# Patient Record
Sex: FEMALE | Race: WHITE | NOT HISPANIC OR LATINO | Employment: FULL TIME | URBAN - METROPOLITAN AREA
[De-identification: names, ages, dates, MRNs, and addresses within clinical notes are randomized per-mention and may not be internally consistent; named-entity substitution may affect disease eponyms.]

---

## 2017-08-17 ENCOUNTER — LAB REQUISITION (OUTPATIENT)
Dept: LAB | Facility: HOSPITAL | Age: 27
End: 2017-08-17
Payer: COMMERCIAL

## 2017-08-17 ENCOUNTER — ALLSCRIPTS OFFICE VISIT (OUTPATIENT)
Dept: OTHER | Facility: OTHER | Age: 27
End: 2017-08-17

## 2017-08-17 DIAGNOSIS — Z01.419 ENCOUNTER FOR GYNECOLOGICAL EXAMINATION WITHOUT ABNORMAL FINDING: ICD-10-CM

## 2017-08-17 PROCEDURE — G0145 SCR C/V CYTO,THINLAYER,RESCR: HCPCS | Performed by: NURSE PRACTITIONER

## 2017-08-23 LAB
LAB AP GYN PRIMARY INTERPRETATION: NORMAL
LAB AP LMP: NORMAL
Lab: NORMAL

## 2017-08-29 ENCOUNTER — GENERIC CONVERSION - ENCOUNTER (OUTPATIENT)
Dept: OTHER | Facility: OTHER | Age: 27
End: 2017-08-29

## 2018-01-17 NOTE — PROGRESS NOTES
Assessment    1  Encounter for routine gynecological examination with Papanicolaou smear of cervix   (V72 31,V76 2) (Z01 419)   2  Contraception management (V25 9) (Z30 9)   3  ASCUS with positive high risk HPV cervical (795 01,795 05) (R87 610,R87 810)    Plan  Contraception management    · NuvaRing 0 12-0 015 MG/24HR Vaginal Ring; INSERT 1 RING VAGINALLY FOR 3  WEEKS THEN 1 WEEK OFF   Rx By: Elyssa Mcneil; Dispense: 84 Days ; #:3 Ring; Refill: 3; For: Contraception management; SHAHBAZ = N; Verified Transmission to [id number] Last Updated By: System, Resolver; 8/17/2017 12:07:29 PM  Unlinked    · Follow-up visit in 1 year Evaluation and Treatment  Follow-up  Status: Hold For -  Scheduling  Requested for: [id number]   Ordered; Ordered By: Elyssa Mcneil Performed:  Due: [id number]    Discussion/Summary  healthy adult female Currently, she eats an adequate diet and has an adequate exercise regimen  the risks and benefits of cervical cancer screening were discussed Pap test with reflex HPV testing was done today Breast cancer screening: self breast exam technique was taught, monthly self breast exam was advised and breast cancer screening is not indicated  Colorectal cancer screening: colorectal cancer screening is not indicated  Osteoporosis screening: bone mineral density testing is not indicated  Advice and education were given regarding nutrition, aerobic exercise, reproductive health and contraception  We discussed her abnormal pap smear history and the current guidelines  We again discussed ways that she can boost her immune system  She is aware that the pap smear results are pending  The patient has the current Goals: To prevent pregnancy  To have a normal pap smear  The patent has the current Barriers: None  Patient is able to Self-Care        Chief Complaint  Pt presents for a yearly exam      History of Present Illness  HPI: The pt  relates that she continues to use the NuvaRing and usually continuously x 3 months without problems  She did have an abnormal pap smear last year while in [address] (ASCUS with positive HPV) and did have a colposcopy  GYN HM, Adult Female [facility]: The patient is being seen for a health maintenance and gynecology evaluation  The last health maintenance visit was 3 5 year(s) ago  General Health: The patient's health since the last visit is described as good  Lifestyle:  She consumes a diverse and healthy diet  She exercises regularly  She does not use tobacco    Reproductive health:  she reports no menstrual problems  Menstrual history: The cycles have been regular  The duration of her recent periods has been regular  she uses contraception  For contraception, she uses the intravaginal ring  she is sexually active  Screening: cancer screening reviewed and current  Review of Systems    Constitutional: No fever, no chills, feels well, no tiredness, no recent weight gain or loss  Cardiovascular: no complaints of slow or fast heart rate, no chest pain, no palpitations, no leg claudication or lower extremity edema  Respiratory: no complaints of shortness of breath, no wheezing, no dyspnea on exertion, no orthopnea or PND  Breasts: no complaints of breast pain, breast lump or nipple discharge  Gastrointestinal: no complaints of abdominal pain, no constipation, no nausea or diarrhea, no vomiting, no bloody stools  Genitourinary: no complaints of dysuria, no incontinence, no pelvic pain, no dysmenorrhea, no vaginal discharge or abnormal vaginal bleeding and as noted in HPI  Integumentary: no complaints of skin rash or lesion, no itching or dry skin, no skin wounds  Neurological: no complaints of headache, no confusion, no numbness or tingling, no dizziness or fainting  Active Problems    1  Encounter for routine gynecological examination with Papanicolaou smear of cervix   (V72 31,V76 2) (Z01 419)   2  Irregular menstrual cycle (626 4) (N92 6)   3  Screening for STD (sexually transmitted disease) (V74 5) (Z11 3)    Past Medical History    · History of infectious mononucleosis (V12 09) (Z86 19)   · Denied: History of pregnancy    The active problems and past medical history were reviewed and updated today  Surgical History    · History of Appendectomy   · History of Biopsy Of Cervix   · History of Oral Surgery Tooth Extraction    The surgical history was reviewed and updated today  Family History  Father    · Family history of diabetes mellitus (V18 0) (Z83 3)   · Family history of sleep apnea (V19 8) (Z82 0)   · Family history of Hypertension (V17 49)   · Family history of Pure Hypercholesterolemia  Maternal Great Grandmother    · Family history of malignant neoplasm of breast (V16 3) (Z80 3)  Family History    · Family history of hypertension (V17 49) (Z82 49)   · Family history of Stroke Syndrome (V17 1)    The family history was reviewed and updated today  Social History    · Marital History - Single   · Never A Smoker   · Social alcohol use (Z78 9)   · Student  The social history was reviewed and is unchanged  Current Meds   1  Biotin CAPS; Therapy: (Recorded:17Aug2017) to Recorded   2  Ciprofloxacin HCl - 500 MG Oral Tablet; Therapy: 89PDW3669 to (Last Rx:24Jzm6348)  Requested for: 68IIZ5317 Ordered   3  Fexofenadine HCl - 180 MG Oral Tablet; Therapy: 09IOU2022 to (Last Rx:08Mar2011)  Requested for: 70QQS0448 Ordered   4  Fluticasone Propionate 50 MCG/ACT Nasal Suspension; Therapy: 48Ebm2293 to (Last Rx:12Xrs1588)  Requested for: 88Tmy1780 Ordered   5  NuvaRing 0 12-0 015 MG/24HR Vaginal Ring; USE 1 RING MONTHLY AS DIRECTED; Therapy: 84LQR1627 to (Rahul Paulino)  Requested for: 85Oss7728; Last   Rx:52Bqq0291 Ordered   6  PredniSONE 5 MG Oral Tablet; Therapy: 61ILU9908 to (Last Rx:81Hgg3836)  Requested for: 23OTI1978 Ordered   7  PROzac CAPS; Therapy: (Recorded:55Wsk3322) to Recorded   8  Vitamin C TABS;    Therapy: (Recorded:80Ooj2519) to Recorded    Allergies    1  Duricef CAPS   2  Pediazole SUSR    3  Seasonal    Vitals   Recorded: 29NZX1256 40:89UZ   Systolic 903   Diastolic 68   Height 5 ft 6 in   Weight 136 lb 2 oz   BMI Calculated 21 97   BSA Calculated 1 7   LMP 56Row7074     Physical Exam    Constitutional   General appearance: No acute distress, well appearing and well nourished  Neck   Neck: Normal, supple, trachea midline, no masses  Thyroid: Normal, no thyromegaly  Pulmonary   Respiratory effort: No increased work of breathing or signs of respiratory distress  Auscultation of lungs: Clear to auscultation  Cardiovascular   Auscultation of heart: Normal rate and rhythm, normal S1 and S2, no murmurs  Genitourinary   External genitalia: Normal and no lesions appreciated  Vagina: Normal, no lesions or dryness appreciated  Urethra: Normal     Urethral meatus: Normal     Bladder: Normal, soft, non-tender and no prolapse or masses appreciated  Cervix: Normal, no palpable masses  A Pap smear was performed  Uterus: Normal, non-tender, not enlarged, and no palpable masses  Adnexa/parametria: Normal, non-tender and no fullness or masses appreciated  Chest   Breasts: Normal and no dimpling or skin changes noted  Abdomen   Abdomen: Normal, non-tender, and no organomegaly noted  Liver and spleen: No hepatomegaly or splenomegaly  Examination for hernias: No hernias appreciated  Lymphatic   Palpation of lymph nodes in neck, axillae, groin and/or other locations: No lymphadenopathy or masses noted  Skin   Skin and subcutaneous tissue: Normal skin turgor and no rashes  Psychiatric   Orientation to person, place, and time: Normal     Mood and affect: Normal        Signatures   Electronically signed by : Asmita Raya;  Aug 17 2017 12:09PM EST                       (Author)    Electronically signed by : Ken Cisneros DO; Aug 18 2017  9:37AM EST

## 2018-01-18 NOTE — RESULT NOTES
Verified Results  (1) THIN PREP PAP WITH IMAGING 04GDG6635 20:15NW Malathi Harrison Order Number: [id number]     Test Name Result Flag Reference   LAB AP CASE REPORT (Report)     Gynecologic Cytology Report            Case: [id number]                  Authorizing Provider: HILDA Camarillo    Collected:      08/17/2017 1359        First Screen:     JERICA Nunes Received:      08/21/2017 1032        Specimen:  LIQUID-BASED PAP, SCREENING, Cervix, Endocervical   LAB AP GYN PRIMARY INTERPRETATION      Negative for intraepithelial lesion or malignancy  Electronically signed by JERICA Nunes on 8/23/2017 at 10:45 AM   LAB AP GYN SPECIMEN ADEQUACY      Satisfactory for evaluation  Endocervical/transformation zone component present  LAB AP GYN ADDITIONAL INFORMATION (Report)     [employer]'s FDA approved ,  and ThinPrep Imaging System are   utilized with strict adherence to the 's instruction manual to   prepare gynecologic and non-gynecologic cytology specimens for the   production of ThinPrep slides as well as for gynecologic ThinPrep imaging  These processes have been validated by our laboratory and/or by the     The Pap test is not a diagnostic procedure and should not be used as the   sole means to detect cervical cancer  It is only a screening procedure to   aid in the detection of cervical cancer and its precursors  Both   false-negative and false-positive results have been experienced  Your   patient's test result should be interpreted in this context together with   the history and clinical findings     LAB AP LMP 7/24/2017

## 2018-01-22 VITALS
SYSTOLIC BLOOD PRESSURE: 124 MMHG | BODY MASS INDEX: 21.88 KG/M2 | HEIGHT: 66 IN | DIASTOLIC BLOOD PRESSURE: 68 MMHG | WEIGHT: 136.13 LBS

## 2019-07-30 NOTE — PROGRESS NOTES
Assessment/Plan:    Will check B/L diagnostic breast ultrasound  The patient likes current contraceptive method and desires to continue [employer]  Recommended monthly SBE and annual CBE  ASCCP guidelines reviewed and pap collected today  STI testing completed today at patient request   She is aware that condoms are recommended with all sexual contact for STI prevention  Reviewed diet/activity recommendations  Return to the office in one year for routine annual gyn exam or sooner PRN  Diagnoses and all orders for this visit:    Encounter for gynecological examination (general) (routine) with abnormal findings    Breast mass  -     US BREAST BILATERAL LIMITED (DIAGNOSTIC); Future    Encounter for surveillance of vaginal ring hormonal contraceptive device  -     etonogestrel-ethinyl estradiol (NUVARING) 0 12-0 015 MG/24HR vaginal ring; Insert vaginally and leave in place for 3 consecutive weeks, then remove for 1 week  Subjective:      Patient ID: Tyrese Haas is a 33 y o  female  This patient presents for routine annual gyn exam    She denies acute gyn complaints  Menses are light and regular  Cystic densities noted on exam and patient stated she has been evaluated for cystic breasts about 4-5 years ago  She denies abn discharge, pelvic pain, bowel/bladder dysfunction  She's had a new sexual partner in the last 6 months and is requesting STI testing  She is aware of financial responsibility  Has had recent screening with blood work secondary to recent work exposure  Patient recently finished dental school, 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  Likes current contraception and desires to continue with Nuva Ring  Last pap 8/27/18  That was the first normal after previous abnormal pap  Will repeat today          The following portions of the patient's history were reviewed and updated as appropriate: allergies, current medications, past family history, past medical history, past social history, past surgical history and problem list     Review of Systems   Constitutional: Negative  HENT: Negative  Respiratory: Negative  Cardiovascular: Negative  Gastrointestinal: Negative  Endocrine: Negative  Genitourinary: Negative for difficulty urinating, dyspareunia, dysuria, frequency, menstrual problem, pelvic pain, urgency, vaginal bleeding, vaginal discharge and vaginal pain  Musculoskeletal: Negative  Skin: Negative  Neurological: Negative  Psychiatric/Behavioral: Negative  Objective:      /74 (BP Location: Right arm)   Pulse 81   Ht 5' 6" (1 676 m)   Wt 62 5 kg (137 lb 12 8 oz)   LMP 06/10/2019   BMI 22 24 kg/m²          Physical Exam   Constitutional: She is oriented to person, place, and time  She appears well-developed and well-nourished  She is cooperative  HENT:   Head: Normocephalic and atraumatic  Neck: Normal range of motion  Neck supple  No thyroid mass and no thyromegaly present  Cardiovascular: Normal rate, regular rhythm and normal heart sounds  Pulmonary/Chest: Effort normal and breath sounds normal  Right breast exhibits mass (cystic densities noted B/L throughout with concentration noted between 12 and 1 oclocl on RIGHT at areola)  Right breast exhibits no inverted nipple, no nipple discharge, no skin change and no tenderness  Left breast exhibits mass (cystic densities noted B/L throughout with concentration noted between 12 and 1 oclocl on RIGHT at areola)  Left breast exhibits no inverted nipple, no nipple discharge, no skin change and no tenderness  No breast tenderness or discharge  Breasts are symmetrical    Abdominal: Soft  Normal appearance and bowel sounds are normal  There is no hepatosplenomegaly  There is no tenderness  Hernia confirmed negative in the right inguinal area and confirmed negative in the left inguinal area  Genitourinary: Vagina normal and uterus normal  Rectal exam shows no external hemorrhoid   No breast tenderness or discharge  Pelvic exam was performed with patient supine  No labial fusion  There is no rash, tenderness, lesion or injury on the right labia  There is no rash, tenderness, lesion or injury on the left labia  Uterus is not deviated, not enlarged, not fixed and not tender  Cervix exhibits no motion tenderness, no discharge and no friability  Right adnexum displays no mass, no tenderness and no fullness  Left adnexum displays no mass, no tenderness and no fullness  No erythema, tenderness or bleeding in the vagina  No signs of injury around the vagina  No vaginal discharge found  Genitourinary Comments: Urethra normal without lesions  No bladder tenderness   Musculoskeletal: Normal range of motion  Lymphadenopathy:     She has no axillary adenopathy  No inguinal adenopathy noted on the right or left side  Right: No inguinal adenopathy present  Left: No inguinal adenopathy present  Neurological: She is alert and oriented to person, place, and time  Skin: Skin is warm, dry and intact  Psychiatric: She has a normal mood and affect  Her speech is normal and behavior is normal  Cognition and memory are normal    Nursing note and vitals reviewed

## 2019-08-01 ENCOUNTER — ANNUAL EXAM (OUTPATIENT)
Dept: GYNECOLOGY | Facility: CLINIC | Age: 29
End: 2019-08-01
Payer: COMMERCIAL

## 2019-08-01 VITALS
DIASTOLIC BLOOD PRESSURE: 74 MMHG | BODY MASS INDEX: 22.14 KG/M2 | HEIGHT: 66 IN | WEIGHT: 137.8 LBS | HEART RATE: 81 BPM | SYSTOLIC BLOOD PRESSURE: 120 MMHG

## 2019-08-01 DIAGNOSIS — Z01.411 ENCOUNTER FOR GYNECOLOGICAL EXAMINATION (GENERAL) (ROUTINE) WITH ABNORMAL FINDINGS: Primary | ICD-10-CM

## 2019-08-01 DIAGNOSIS — Z30.44 ENCOUNTER FOR SURVEILLANCE OF VAGINAL RING HORMONAL CONTRACEPTIVE DEVICE: ICD-10-CM

## 2019-08-01 DIAGNOSIS — Z12.4 ENCOUNTER FOR PAPANICOLAOU SMEAR FOR CERVICAL CANCER SCREENING: ICD-10-CM

## 2019-08-01 DIAGNOSIS — N63.0 BREAST MASS: ICD-10-CM

## 2019-08-01 DIAGNOSIS — Z11.3 SCREENING EXAMINATION FOR STD (SEXUALLY TRANSMITTED DISEASE): ICD-10-CM

## 2019-08-01 PROCEDURE — S0612 ANNUAL GYNECOLOGICAL EXAMINA: HCPCS | Performed by: OBSTETRICS & GYNECOLOGY

## 2019-08-01 PROCEDURE — 87491 CHLMYD TRACH DNA AMP PROBE: CPT | Performed by: OBSTETRICS & GYNECOLOGY

## 2019-08-01 PROCEDURE — 87591 N.GONORRHOEAE DNA AMP PROB: CPT | Performed by: OBSTETRICS & GYNECOLOGY

## 2019-08-01 PROCEDURE — G0145 SCR C/V CYTO,THINLAYER,RESCR: HCPCS | Performed by: OBSTETRICS & GYNECOLOGY

## 2019-08-01 RX ORDER — ETONOGESTREL AND ETHINYL ESTRADIOL 11.7; 2.7 MG/1; MG/1
INSERT, EXTENDED RELEASE VAGINAL
Qty: 1 EACH | Refills: 11 | Status: SHIPPED | OUTPATIENT
Start: 2019-08-01 | End: 2020-02-07 | Stop reason: SDUPTHER

## 2019-08-01 NOTE — PATIENT INSTRUCTIONS
Will check B/L diagnostic breast ultrasound  The patient likes current contraceptive method and desires to continue [employer]  Recommended monthly SBE and annual CBE  ASCCP guidelines reviewed and pap collected today  STI testing completed today at patient request   She is aware that condoms are recommended with all sexual contact for STI prevention  Reviewed diet/activity recommendations   Return to the office in one year for routine annual gyn exam or sooner PRN

## 2019-08-05 LAB
C TRACH DNA SPEC QL NAA+PROBE: NEGATIVE
N GONORRHOEA DNA SPEC QL NAA+PROBE: NEGATIVE

## 2019-08-06 LAB
LAB AP GYN PRIMARY INTERPRETATION: NORMAL
Lab: NORMAL

## 2019-08-15 ENCOUNTER — HOSPITAL ENCOUNTER (OUTPATIENT)
Dept: ULTRASOUND IMAGING | Facility: CLINIC | Age: 29
Discharge: HOME | End: 2019-08-15
Payer: COMMERCIAL

## 2019-08-15 VITALS — HEIGHT: 66 IN | WEIGHT: 138 LBS | BODY MASS INDEX: 22.18 KG/M2

## 2019-08-15 DIAGNOSIS — N63.0 BREAST MASS: ICD-10-CM

## 2019-08-15 PROCEDURE — 76642 ULTRASOUND BREAST LIMITED: CPT

## 2019-09-26 ENCOUNTER — TELEPHONE (OUTPATIENT)
Dept: GYNECOLOGY | Facility: CLINIC | Age: 29
End: 2019-09-26

## 2019-09-26 NOTE — TELEPHONE ENCOUNTER
Patient called and is having questions about a bill  She did call [facility] but did not get any satisfaction  Can you please call her?

## 2019-09-26 NOTE — TELEPHONE ENCOUNTER
Called the patient and informed her that I would talk to our billing department to potentially give her a one time courtesy write off  Informed her once I got the information that I would call her back sometime next week

## 2020-02-07 DIAGNOSIS — Z30.44 ENCOUNTER FOR SURVEILLANCE OF VAGINAL RING HORMONAL CONTRACEPTIVE DEVICE: ICD-10-CM

## 2020-02-07 RX ORDER — ETONOGESTREL AND ETHINYL ESTRADIOL 11.7; 2.7 MG/1; MG/1
INSERT, EXTENDED RELEASE VAGINAL
Qty: 1 EACH | Refills: 6 | Status: SHIPPED | OUTPATIENT
Start: 2020-02-07 | End: 2020-08-03 | Stop reason: SDUPTHER

## 2020-08-03 DIAGNOSIS — Z30.44 ENCOUNTER FOR SURVEILLANCE OF VAGINAL RING HORMONAL CONTRACEPTIVE DEVICE: ICD-10-CM

## 2020-08-03 RX ORDER — ETONOGESTREL AND ETHINYL ESTRADIOL 11.7; 2.7 MG/1; MG/1
INSERT, EXTENDED RELEASE VAGINAL
Qty: 1 EACH | Refills: 0 | Status: SHIPPED | OUTPATIENT
Start: 2020-08-03 | End: 2020-08-04

## 2020-08-04 RX ORDER — ETONOGESTREL AND ETHINYL ESTRADIOL 11.7; 2.7 MG/1; MG/1
INSERT, EXTENDED RELEASE VAGINAL
Qty: 3 EACH | Refills: 3 | Status: SHIPPED | OUTPATIENT
Start: 2020-08-04 | End: 2020-08-06 | Stop reason: SDUPTHER

## 2020-08-05 NOTE — PROGRESS NOTES
Assessment/Plan:    Normal findings on routine gyn exam  The patient likes current contraceptive method and desires to continue Nuva Ring continuously 3 months at a time  Recommended monthly SBE and annual CBE  ASCCP guidelines reviewed and pap collected today at patient's request  The patient declines STI testing at this time  She is aware that condoms are recommended with all sexual contact for STI prevention  Reviewed diet/activity recommendations  Return to the office in one year for routine annual gyn exam or sooner PRN  Diagnoses and all orders for this visit:    Encounter for gynecological examination (general) (routine) without abnormal findings    Encounter for surveillance of vaginal ring hormonal contraceptive device  -     etonogestrel-ethinyl estradiol (NUVARING) 0 12-0 015 MG/24HR vaginal ring; INSERT 1 RING VAGINALLY AS DIRECTED  REMOVE AFTER 3 WEEKS & WAIT 7 DAYS BEFORE INSERTING A NEW RING        Subjective:      Patient ID: Cely Haas is a 30 y o  female  This patient presents for routine annual gyn exam    Patient has known hx of B/L breast cysts, not bothersome  Menses are light on Nuva Ring  She uses it continuously for 3 months at a time  She denies breast concerns, abn discharge, pelvic pain, bowel/bladder dysfunction, depression/anxiety  Patient has not been sexually active since last visit  Denies STI concerns  Likes current contraception and desires to continue  Negative pap, GC/CT 8/1/19  She is requesting repeat pap  The following portions of the patient's history were reviewed and updated as appropriate: allergies, current medications, past family history, past medical history, past social history, past surgical history and problem list     Review of Systems   Constitutional: Negative  HENT: Negative  Respiratory: Negative  Cardiovascular: Negative  Gastrointestinal: Negative  Endocrine: Negative      Genitourinary: Negative for difficulty urinating, dyspareunia, dysuria, frequency, menstrual problem, pelvic pain, urgency, vaginal bleeding, vaginal discharge and vaginal pain  Musculoskeletal: Negative  Skin: Negative  Neurological: Negative  Psychiatric/Behavioral: Negative  Objective:      /62 (BP Location: Right arm, Patient Position: Sitting, Cuff Size: Standard)   Pulse 66   Ht 5' 6 5" (1 689 m)   Wt 59 9 kg (132 lb)   BMI 20 99 kg/m²          Physical Exam   Constitutional: She is oriented to person, place, and time  She appears well-developed  She is cooperative  HENT:   Head: Normocephalic and atraumatic  Neck: Normal range of motion  Neck supple  No thyroid mass and no thyromegaly present  Cardiovascular: Normal rate, regular rhythm and normal heart sounds  Pulmonary/Chest: Effort normal and breath sounds normal  Right breast exhibits no inverted nipple, no mass, no nipple discharge, no skin change and no tenderness  Left breast exhibits no inverted nipple, no mass, no nipple discharge, no skin change and no tenderness  Breasts are symmetrical    Abdominal: Soft  Normal appearance and bowel sounds are normal  There is no abdominal tenderness  Hernia confirmed negative in the left inguinal area and confirmed negative in the right inguinal area  Genitourinary:    Vulva and vagina normal    Rectum:      No external hemorrhoid  Pelvic exam was performed with patient supine  There is no rash, tenderness, lesion or injury on the right labia  There is no rash, tenderness, lesion or injury on the left labia  Uterus is not deviated, not enlarged, not fixed and not tender  Cervix exhibits no motion tenderness, no lesion, no discharge and no friability  Right adnexum displays no mass, no tenderness and no fullness  Left adnexum displays no mass, no tenderness and no fullness  No vaginal discharge, erythema, tenderness or bleeding  No erythema, tenderness or bleeding in the vagina      No foreign body in the vagina  No signs of injury or lesions in the vagina  Genitourinary Comments: Urethra normal without lesions  No bladder tenderness     Musculoskeletal: Normal range of motion  Lymphadenopathy: No inguinal adenopathy noted on the right or left side  Neurological: She is alert and oriented to person, place, and time  Skin: Skin is warm and dry  Psychiatric: Her speech is normal and behavior is normal    Nursing note and vitals reviewed

## 2020-08-06 ENCOUNTER — ANNUAL EXAM (OUTPATIENT)
Dept: GYNECOLOGY | Facility: CLINIC | Age: 30
End: 2020-08-06
Payer: COMMERCIAL

## 2020-08-06 VITALS
HEIGHT: 67 IN | WEIGHT: 132 LBS | BODY MASS INDEX: 20.72 KG/M2 | HEART RATE: 66 BPM | DIASTOLIC BLOOD PRESSURE: 62 MMHG | SYSTOLIC BLOOD PRESSURE: 102 MMHG

## 2020-08-06 DIAGNOSIS — Z01.419 ENCOUNTER FOR GYNECOLOGICAL EXAMINATION WITH PAPANICOLAOU SMEAR OF CERVIX: ICD-10-CM

## 2020-08-06 DIAGNOSIS — Z01.419 ENCOUNTER FOR GYNECOLOGICAL EXAMINATION (GENERAL) (ROUTINE) WITHOUT ABNORMAL FINDINGS: Primary | ICD-10-CM

## 2020-08-06 DIAGNOSIS — Z30.44 ENCOUNTER FOR SURVEILLANCE OF VAGINAL RING HORMONAL CONTRACEPTIVE DEVICE: ICD-10-CM

## 2020-08-06 PROCEDURE — G0145 SCR C/V CYTO,THINLAYER,RESCR: HCPCS | Performed by: OBSTETRICS & GYNECOLOGY

## 2020-08-06 PROCEDURE — S0612 ANNUAL GYNECOLOGICAL EXAMINA: HCPCS | Performed by: OBSTETRICS & GYNECOLOGY

## 2020-08-06 RX ORDER — LORATADINE 10 MG/1
10 TABLET ORAL
COMMUNITY

## 2020-08-06 RX ORDER — ETONOGESTREL AND ETHINYL ESTRADIOL 11.7; 2.7 MG/1; MG/1
INSERT, EXTENDED RELEASE VAGINAL
Qty: 3 EACH | Refills: 3 | Status: SHIPPED | OUTPATIENT
Start: 2020-08-06 | End: 2021-08-12 | Stop reason: SDUPTHER

## 2020-08-12 LAB
LAB AP GYN PRIMARY INTERPRETATION: NORMAL
Lab: NORMAL

## 2021-08-10 NOTE — PROGRESS NOTES
Assessment/Plan:    The patient likes current contraceptive method and desires to continue the use of continuous Nuva Ring  Recommended monthly SBE and annual CBE  ASCCP guidelines reviewed and pap collected today given new partner  GC/CT sent  100% condom use  recommended with all sexual contact for STI prevention  Reviewed diet/activity recommendations  Return to the office in one year for routine annual gyn exam or sooner PRN  Diagnoses and all orders for this visit:    Encounter for gynecological examination (general) (routine) without abnormal findings    Encounter for surveillance of vaginal ring hormonal contraceptive device  -     etonogestrel-ethinyl estradiol (NUVARING) 0 12-0 015 MG/24HR vaginal ring; USES CONTINUOUSLY    Encounter for Papanicolaou smear for cervical cancer screening  -     Liquid-based pap, screening    High risk sexual behavior, unspecified type  -     Chlamydia/GC amplified DNA by PCR    Screening examination for STD (sexually transmitted disease)  -     Chlamydia/GC amplified DNA by PCR        Subjective:      Patient ID: Elmo Gibbons is a 35 y o  female  This patient presents for routine annual gyn exam    She denies acute gyn complaints  Menses are absent on continuous Nuva Ring  Has a hx of breast cysts, no bothersome  She does do SBE  She denies abn discharge, pelvic pain, bowel/bladder dysfunction, depression/anxiety  New sexual partner for 4 weeks without condom use  She did have negative HIV, Hep B testing prior to possibly starting Humira  She is requesting STI screening and will add GC/CT to pap  Likes current contraception and desires to continue  Pap 8/6/20  Patient states she has had the Gardasil series          The following portions of the patient's history were reviewed and updated as appropriate: allergies, current medications, past family history, past medical history, past social history, past surgical history and problem list     Review of Systems Constitutional: Negative  HENT: Negative  Respiratory: Negative  Cardiovascular: Negative  Gastrointestinal: Negative  Endocrine: Negative  Genitourinary: Negative for difficulty urinating, dyspareunia, dysuria, frequency, menstrual problem, pelvic pain, urgency, vaginal bleeding, vaginal discharge and vaginal pain  Musculoskeletal: Negative  Skin: Negative  Neurological: Negative  Psychiatric/Behavioral: Negative  Objective:      /68   Pulse 97   Ht 5' 6" (1 676 m)   Wt 63 1 kg (139 lb 3 2 oz)   BMI 22 47 kg/m²          Physical Exam  Vitals and nursing note reviewed  Exam conducted with a chaperone present  Constitutional:       Appearance: Normal appearance  She is well-developed  HENT:      Head: Normocephalic and atraumatic  Neck:      Thyroid: No thyroid mass or thyromegaly  Cardiovascular:      Rate and Rhythm: Normal rate and regular rhythm  Heart sounds: Normal heart sounds  Pulmonary:      Effort: Pulmonary effort is normal       Breath sounds: Normal breath sounds  Chest:      Breasts: Breasts are symmetrical          Right: No inverted nipple, mass, nipple discharge, skin change or tenderness  Left: No inverted nipple, mass, nipple discharge, skin change or tenderness  Abdominal:      General: Bowel sounds are normal       Palpations: Abdomen is soft  Tenderness: There is no abdominal tenderness  Hernia: There is no hernia in the left inguinal area or right inguinal area  Genitourinary:     General: Normal vulva  Exam position: Supine  Pubic Area: No rash  Labia:         Right: No rash, tenderness, lesion or injury  Left: No rash, tenderness, lesion or injury  Urethra: No prolapse, urethral pain, urethral swelling or urethral lesion  Vagina: Normal  No signs of injury and foreign body  No vaginal discharge, erythema, tenderness, bleeding, lesions or prolapsed vaginal walls  Cervix: Friability, erythema and cervical bleeding (with pap) present  No cervical motion tenderness, discharge, lesion or eversion  Uterus: Not deviated, not enlarged, not fixed, not tender and no uterine prolapse  Adnexa:         Right: No mass, tenderness or fullness  Left: No mass, tenderness or fullness  Rectum: No external hemorrhoid  Comments: Urethra normal without lesions  No bladder tenderness  Musculoskeletal:         General: Normal range of motion  Cervical back: Normal range of motion and neck supple  Lymphadenopathy:      Lower Body: No right inguinal adenopathy  No left inguinal adenopathy  Skin:     General: Skin is warm and dry  Neurological:      Mental Status: She is alert and oriented to person, place, and time  Psychiatric:         Speech: Speech normal          Behavior: Behavior normal  Behavior is cooperative

## 2021-08-12 ENCOUNTER — ANNUAL EXAM (OUTPATIENT)
Dept: GYNECOLOGY | Facility: CLINIC | Age: 31
End: 2021-08-12
Payer: COMMERCIAL

## 2021-08-12 VITALS
SYSTOLIC BLOOD PRESSURE: 118 MMHG | HEART RATE: 97 BPM | WEIGHT: 139.2 LBS | BODY MASS INDEX: 22.37 KG/M2 | HEIGHT: 66 IN | DIASTOLIC BLOOD PRESSURE: 68 MMHG

## 2021-08-12 DIAGNOSIS — Z01.419 ENCOUNTER FOR GYNECOLOGICAL EXAMINATION (GENERAL) (ROUTINE) WITHOUT ABNORMAL FINDINGS: Primary | ICD-10-CM

## 2021-08-12 DIAGNOSIS — Z30.44 ENCOUNTER FOR SURVEILLANCE OF VAGINAL RING HORMONAL CONTRACEPTIVE DEVICE: ICD-10-CM

## 2021-08-12 DIAGNOSIS — Z12.4 ENCOUNTER FOR PAPANICOLAOU SMEAR FOR CERVICAL CANCER SCREENING: ICD-10-CM

## 2021-08-12 DIAGNOSIS — Z11.3 SCREENING EXAMINATION FOR STD (SEXUALLY TRANSMITTED DISEASE): ICD-10-CM

## 2021-08-12 DIAGNOSIS — Z72.51 HIGH RISK SEXUAL BEHAVIOR, UNSPECIFIED TYPE: ICD-10-CM

## 2021-08-12 PROCEDURE — G0145 SCR C/V CYTO,THINLAYER,RESCR: HCPCS | Performed by: OBSTETRICS & GYNECOLOGY

## 2021-08-12 PROCEDURE — 87491 CHLMYD TRACH DNA AMP PROBE: CPT | Performed by: OBSTETRICS & GYNECOLOGY

## 2021-08-12 PROCEDURE — S0612 ANNUAL GYNECOLOGICAL EXAMINA: HCPCS | Performed by: OBSTETRICS & GYNECOLOGY

## 2021-08-12 PROCEDURE — 87591 N.GONORRHOEAE DNA AMP PROB: CPT | Performed by: OBSTETRICS & GYNECOLOGY

## 2021-08-12 PROCEDURE — G0476 HPV COMBO ASSAY CA SCREEN: HCPCS | Performed by: OBSTETRICS & GYNECOLOGY

## 2021-08-12 RX ORDER — LOTEPREDNOL ETABONATE 10 MG/ML
SUSPENSION TOPICAL
COMMUNITY
Start: 2021-08-04

## 2021-08-12 RX ORDER — ETONOGESTREL AND ETHINYL ESTRADIOL 11.7; 2.7 MG/1; MG/1
INSERT, EXTENDED RELEASE VAGINAL
Qty: 5 EACH | Refills: 3 | Status: SHIPPED | OUTPATIENT
Start: 2021-08-12

## 2021-08-16 LAB
HPV HR 12 DNA CVX QL NAA+PROBE: NEGATIVE
HPV16 DNA CVX QL NAA+PROBE: NEGATIVE
HPV18 DNA CVX QL NAA+PROBE: NEGATIVE

## 2021-08-17 LAB
C TRACH DNA SPEC QL NAA+PROBE: NEGATIVE
N GONORRHOEA DNA SPEC QL NAA+PROBE: NEGATIVE

## 2021-08-18 ENCOUNTER — TELEPHONE (OUTPATIENT)
Dept: GYNECOLOGY | Facility: CLINIC | Age: 31
End: 2021-08-18

## 2021-08-18 NOTE — TELEPHONE ENCOUNTER
Pt called complaining of some bleeding from her nuvaring  She stated was made aware of the breakthrough bleeding  She would like to know if she should keep it in until she is due to change it which would be Saturday or swap it out earlier  Pt made aware you are gone for the day and we will have an answer by tomorrow 8/19

## 2021-08-19 ENCOUNTER — DOCUMENTATION (OUTPATIENT)
Dept: GYNECOLOGY | Facility: CLINIC | Age: 31
End: 2021-08-19

## 2021-08-19 NOTE — TELEPHONE ENCOUNTER
Please let pt know she should check to make sure the ring is there and continue with the ring as normal

## 2021-08-23 LAB
LAB AP GYN PRIMARY INTERPRETATION: NORMAL
Lab: NORMAL

## 2021-08-30 ENCOUNTER — DOCUMENTATION (OUTPATIENT)
Dept: GYNECOLOGY | Facility: CLINIC | Age: 31
End: 2021-08-30

## 2021-09-07 ENCOUNTER — DOCUMENTATION (OUTPATIENT)
Dept: GYNECOLOGY | Facility: CLINIC | Age: 31
End: 2021-09-07

## 2021-09-21 ENCOUNTER — DOCUMENTATION (OUTPATIENT)
Dept: GYNECOLOGY | Facility: CLINIC | Age: 31
End: 2021-09-21

## 2024-06-02 ENCOUNTER — OFFICE VISIT (OUTPATIENT)
Age: 34
End: 2024-06-02
Payer: COMMERCIAL

## 2024-06-02 VITALS
RESPIRATION RATE: 18 BRPM | SYSTOLIC BLOOD PRESSURE: 124 MMHG | TEMPERATURE: 96.6 F | HEART RATE: 66 BPM | OXYGEN SATURATION: 100 % | DIASTOLIC BLOOD PRESSURE: 70 MMHG

## 2024-06-02 DIAGNOSIS — L73.9 FOLLICULITIS: ICD-10-CM

## 2024-06-02 DIAGNOSIS — B37.2 SKIN YEAST INFECTION: Primary | ICD-10-CM

## 2024-06-02 PROCEDURE — S9083 URGENT CARE CENTER GLOBAL: HCPCS | Performed by: EMERGENCY MEDICINE

## 2024-06-02 PROCEDURE — G0382 LEV 3 HOSP TYPE B ED VISIT: HCPCS | Performed by: EMERGENCY MEDICINE

## 2024-06-02 RX ORDER — FLUCONAZOLE 150 MG/1
150 TABLET ORAL ONCE
Qty: 1 TABLET | Refills: 0 | Status: SHIPPED | OUTPATIENT
Start: 2024-06-02 | End: 2024-06-02

## 2024-06-02 RX ORDER — DOXYCYCLINE 100 MG/1
100 TABLET ORAL 2 TIMES DAILY
Qty: 20 TABLET | Refills: 0 | Status: SHIPPED | OUTPATIENT
Start: 2024-06-02 | End: 2024-06-12

## 2024-06-02 RX ORDER — CLOTRIMAZOLE 1 %
CREAM (GRAM) TOPICAL 2 TIMES DAILY
Qty: 28 G | Refills: 0 | Status: SHIPPED | OUTPATIENT
Start: 2024-06-02

## 2024-06-02 NOTE — PATIENT INSTRUCTIONS
Folliculitis   WHAT YOU NEED TO KNOW:   Folliculitis is inflammation of your hair follicles. A hair follicle is a sac under your skin. Your hair grows out of the follicle. Folliculitis is caused by bacteria or funguses, most commonly a germ called Staph. Folliculitis can occur anywhere you have hair.  DISCHARGE INSTRUCTIONS:   Return to the emergency department if:   You develop large areas of red, warm, tender skin around the folliculitis.    You develop boils (red, painful bumps that develop under your skin).    Call your doctor or dermatologist if:   You have a fever.    You have foul-smelling pus coming from the bumps on your skin.    Your rash is spreading.    You have questions or concerns about your condition or care.    Medicines:  You may need any of the following:  Antibiotics  help fight or prevent a bacterial infection. It may be given as an ointment that you apply to your skin or as a pill. Always take your antibiotics exactly as ordered by your healthcare provider. Never save antibiotics or take leftover antibiotics that were given to you for another illness.    Antifungal medicine  may be given as an cream that you apply to your skin or take as a pill.    NSAIDs , such as ibuprofen, help decrease swelling, pain, and fever. This medicine is available with or without a doctor's order. NSAIDs can cause stomach bleeding or kidney problems in certain people. If you take blood thinner medicine, always ask if NSAIDs are safe for you. Always read the medicine label and follow directions. Do not give these medicines to children younger than 6 months without direction from a healthcare provider.     Antihistamines  help decrease itching.    Take your medicine as directed.  Contact your healthcare provider if you think your medicine is not helping or if you have side effects. Tell your provider if you are allergic to any medicine. Keep a list of the medicines, vitamins, and herbs you take. Include the amounts,  and when and why you take them. Bring the list or the pill bottles to follow-up visits. Carry your medicine list with you in case of an emergency.    Manage folliculitis:   Clean the area.  Use antibacterial soap to wash the affected area. Change your washcloths and towels every day.    Apply a warm compress.  Wet a clean washcloth with warm water and apply it to the infected skin area to help decrease pain and swelling. Warm compresses may also help drain pus and improve healing.    Do not shave the area.  If possible, do not shave areas that have folliculitis. If you must shave, use an electric razor or new blade every time you shave.    Prevent folliculitis:   Do not share personal items.  Personal items include towels, soap, nail cutters, dishes, and silverware.    Do not wear tight clothing.  Tight-fitting clothes may rub against and irritate your skin.    Treat skin injuries right away.  Wash an injury such as a cut or scrape right away. Use warm, soapy water. Cover the area with a bandage to prevent infection.    Follow up with your doctor or dermatologist as directed:  Write down your questions so you remember to ask them during your visits.  © Copyright [employer] 2023 Information is for End User's use only and may not be sold, redistributed or otherwise used for commercial purposes.  The above information is an  only. It is not intended as medical advice for individual conditions or treatments. Talk to your doctor, nurse or pharmacist before following any medical regimen to see if it is safe and effective for you.  Skin Yeast Infection   WHAT YOU NEED TO KNOW:   Yeast is normally present on the skin. Infection happens when you have too much yeast, or when it gets into a cut on your skin. Certain types of mold and fungus can cause a yeast infection. A skin yeast infection can appear anywhere on your skin or nail beds. Skin yeast infections are usually found on warm, moist parts of the body.  Examples include between skin folds or under the breasts.  DISCHARGE INSTRUCTIONS:   Return to the emergency department if:   You have signs of infection, such as pus, warmth or red streaks coming from the wound, or a fever.      Call your doctor if:   Your symptoms worsen or do not get better within 7 to 10 days.    You have new or returning signs of a skin yeast infection after treatment.    You have questions or concerns about your condition or care.    Medicines:   Antifungal medicine  may be given as a cream, ointment, or pill.    Take your medicine as directed.  Contact your healthcare provider if you think your medicine is not helping or if you have side effects. Tell your provider if you are allergic to any medicine. Keep a list of the medicines, vitamins, and herbs you take. Include the amounts, and when and why you take them. Bring the list or the pill bottles to follow-up visits. Carry your medicine list with you in case of an emergency.    Care for the skin near the infection:  You may only have discolored patches of skin, or areas that are dry and flaking. Care for these skin problems as directed by your healthcare provider. If you have painful skin or an open sore, you will need to protect the skin and prevent damage. You will also need to keep the skin dry as much as possible. Ask your healthcare provider how to care for your skin while the infection clears. The following are general guidelines for caring for painful or open skin:  Keep the skin clean.  Ask your healthcare provider if you should wash with mild soap and water. Do not use soap that contains alcohol. Alcohol can dry and irritate the skin and make symptoms worse. Your baby's healthcare provider may tell you to use diaper cream or ointment when you change his or her diaper. This will protect the skin and prevent moisture from collecting.    Keep the skin dry.  Pat the area dry with a towel. Do not rub, because this may irritate the skin.  If you have a skin yeast infection between skin folds, lift the top part gently and hold it while you dry between your skin folds. Always dry your feet completely after you swim or bathe, including between your toes. Dry your skin if you are sweating from exercise or exposure to heat. Use a clean towel each time to prevent spreading or continuing the infection.    Keep the skin protected.  Ask your healthcare provider if you should cover the area with a bandage or leave it open. Check your skin each day to make sure you do not have new or worsening problems. You may need to have someone check the skin if you cannot see the area easily.    Prevent another skin yeast infection:   Do not share clothing or towels    Wear shower shoes if you need to use a public shower    Dry your feet completely after you bathe, and apply antifungal powder or cream as directed    Put on socks before you get dressed so you do not spread fungus from your feet    Wear light clothing that allows air to get to your skin    Manage your weight to prevent skin folds where yeast can collect    Manage diabetes    Use antibiotics correctly to prevent antibiotic resistance    Change your baby's diaper often, and keep the area clean and dry as much as possible    Use a diaper cream or ointment that contains zinc oxide or dimethicone on your baby's diaper area as directed    Follow up with your doctor as directed:  Write down your questions so you remember to ask them during your visits.  © Copyright [employer] 2023 Information is for End User's use only and may not be sold, redistributed or otherwise used for commercial purposes.  The above information is an  only. It is not intended as medical advice for individual conditions or treatments. Talk to your doctor, nurse or pharmacist before following any medical regimen to see if it is safe and effective for you.  Doxycycline (By mouth)   Doxycycline ([id number])  Treats and prevents  infections. Also used to prevent malaria and treat rosacea or severe acne. This medicine is a tetracycline antibiotic.   Brand Name(s): Acticlate, Adoxa, Adoxa Daniel 1/150, Avidoxy, Doryx, Doryx MPC, LymePak, Mondoxyne NL, Monodox, Morgidox 6E151NL, Morgidox 4L072IU, Oracea, Targadox, Vibramycin Calcium, Vibramycin Hyclate   There may be other brand names for this medicine.  When This Medicine Should Not Be Used:   This medicine is not right for everyone. Do not use it if you had an allergic reaction to doxycycline or another tetracycline antibiotic, or if you are pregnant or breastfeeding.  How to Use This Medicine:   Capsule, Delayed Release Capsule, Long Acting Capsule, Liquid, Tablet, Delayed Release Tablet  Your doctor will tell you how much medicine to use. Do not use more than directed.  Ask your pharmacist or doctor if you need to take this medicine with or without food. Some forms can be taken with food or milk, but others must be taken on an empty stomach.  Capsule: Swallow whole. Do not break, crush, chew, or open it.  Oracea® capsules: This medicine must be taken on an empty stomach, at least 1 hour before or 2 hours after a meal.  Acticlate® Cap capsules: You may take this medicine with food or milk to avoid stomach irritation.  Delayed-release capsules: You may also take this medicine by sprinkling the open capsules onto cold, soft applesauce. Do not lose any pellets when transferring the contents. Swallow this mixture right away. Do not chew it. Do not store the mixture for later use. You may take this medicine with food or milk to avoid stomach upset.  Delayed-release tablets: You may also take this medicine by sprinkling the broken tablets onto room-temperature applesauce. Swallow this mixture right away. Do not chew it. Do not store the mixture for later use. You may take this medicine with food or milk to avoid stomach upset.  Oral liquid: Shake the bottle well just before each use. Measure the oral  liquid medicine with a marked measuring spoon, oral syringe, or medicine cup.  Tablets: You may take this medicine with food or milk to avoid stomach irritation. To break a tablet, hold the tablet between your thumb and index fingers close to the appropriate scored line. Then, apply enough pressure to snap the tablet segments apart. Do not use the tablet if it does not break on the scored lines.  Take all of the medicine in your prescription to clear up your infection, even if you feel better after the first few doses.  Drink plenty of fluids to avoid throat problems, if you take the capsule or tablet form.  Malaria prevention: Start taking the medicine 1 or 2 days before you travel. Take the medicine every day during your trip. Keep taking it for 4 weeks after you return. However, do not use the medicine for longer than 4 months.  Do not use this medicine for more than 9 months if you are using it for rosacea.  Use only the brand of medicine your doctor prescribed. Other brands may not work the same way.  Read and follow the patient instructions that come with this medicine. Talk to your doctor or pharmacist if you have any questions.  Missed dose: Take a dose as soon as you remember. If it is almost time for your next dose, wait until then and take a regular dose. Do not take extra medicine to make up for a missed dose.  Store the medicine in a closed container at room temperature, away from heat, moisture, and direct light. Do not freeze the oral liquid.  Drugs and Foods to Avoid:   Ask your doctor or pharmacist before using any other medicine, including over-the-counter medicines, vitamins, and herbal products.  Some medicines can affect how doxycycline works. Tell your doctor if you are using any of the following:  Bismuth subsalicylate, methoxyflurane, penicillin  Acne medicines (including isotretinoin)  Birth control pills  Blood thinner (including warfarin)  Medicine for seizures (including carbamazepine,  phenobarbital, phenytoin)  Medicine that contains aluminum, calcium, magnesium, or iron (including an antacid or vitamin supplement)  Stomach medicine  Warnings While Using This Medicine:   This medicine may cause birth defects if either partner is using it during conception or pregnancy. Tell your doctor right away if you or your partner becomes pregnant. Birth control pills may not work as well when used together with this medicine. Use a second form of birth control to keep from getting pregnant.  Tell your doctor if you have kidney disease, liver disease, asthma, or an allergy to sulfites. Tell your doctor if you had surgery on your stomach, or if you have a history of yeast infections.  This medicine may cause the following problems:  Permanent change in tooth color (in children younger than 8 years of age)  Serious skin reactions, including exfoliative dermatitis, erythema multiforme, Pace-Baron syndrome (SJS) , toxic epidermal necrolysis, drug reaction with eosinophilia and systemic symptoms (DRESS)  Increased pressure inside the head  Yeast infection  Immune system problems  This medicine can cause diarrhea. Call your doctor if the diarrhea becomes severe, does not stop, or is bloody. Do not take any medicine to stop diarrhea until you have talked to your doctor. Diarrhea can occur 2 months or more after you stop taking this medicine.  This medicine may make your skin more sensitive to sunlight. Wear sunscreen. Do not use sunlamps or tanning beds.  Tell any doctor or dentist who treats you that you are using this medicine. This medicine may affect certain medical test results.  Call your doctor if your symptoms do not improve or if they get worse.  Your doctor will do lab tests at regular visits to check on the effects of this medicine. Keep all appointments.  Keep all medicine out of the reach of children. Never share your medicine with anyone.  Possible Side Effects While Using This Medicine:   Call  your doctor right away if you notice any of these side effects:  Allergic reaction: Itching or hives, swelling in your face or hands, swelling or tingling in your mouth or throat, chest tightness, trouble breathing  Blistering, peeling, red skin rash  Burning, pain, or irritation in your upper stomach or throat  Diarrhea that may contain blood  Fever, chills, cough, runny or stuffy nose, sore throat, body aches  Joint pain, unusual tiredness or weakness  Severe headache, dizziness, vision changes  Sudden and severe stomach pain, nausea, vomiting, lightheadedness  Swollen, painful, or tender lymph glands in the neck, armpit, or groin, or yellow skin or eyes  If you notice these less serious side effects, talk with your doctor:   Darkening of your skin, scars, teeth, or gums  Sores or white patches on your lips, mouth, or throat  If you notice other side effects that you think are caused by this medicine, tell your doctor.   Call your doctor for medical advice about side effects. You may report side effects to FDA at 5-791-FDA-5575  © Copyright [employer] 2023 Information is for End User's use only and may not be sold, redistributed or otherwise used for commercial purposes.  The above information is an  only. It is not intended as medical advice for individual conditions or treatments. Talk to your doctor, nurse or pharmacist before following any medical regimen to see if it is safe and effective for you.

## 2024-06-02 NOTE — PROGRESS NOTES
[facility]        NAME: Alfreda Mederos is a 33 y.o. female  : 1990    MRN: [id number]  DATE: 2024  TIME: 10:15 AM    Assessment and Plan   Skin yeast infection [B37.2]  1. Skin yeast infection  clotrimazole (LOTRIMIN) 1 % cream    fluconazole (DIFLUCAN) 150 mg tablet      2. Folliculitis  doxycycline (ADOXA) 100 MG tablet            Patient Instructions     Patient Instructions   Folliculitis   WHAT YOU NEED TO KNOW:   Folliculitis is inflammation of your hair follicles. A hair follicle is a sac under your skin. Your hair grows out of the follicle. Folliculitis is caused by bacteria or funguses, most commonly a germ called Staph. Folliculitis can occur anywhere you have hair.  DISCHARGE INSTRUCTIONS:   Return to the emergency department if:   You develop large areas of red, warm, tender skin around the folliculitis.    You develop boils (red, painful bumps that develop under your skin).    Call your doctor or dermatologist if:   You have a fever.    You have foul-smelling pus coming from the bumps on your skin.    Your rash is spreading.    You have questions or concerns about your condition or care.    Medicines:  You may need any of the following:  Antibiotics  help fight or prevent a bacterial infection. It may be given as an ointment that you apply to your skin or as a pill. Always take your antibiotics exactly as ordered by your healthcare provider. Never save antibiotics or take leftover antibiotics that were given to you for another illness.    Antifungal medicine  may be given as an cream that you apply to your skin or take as a pill.    NSAIDs , such as ibuprofen, help decrease swelling, pain, and fever. This medicine is available with or without a doctor's order. NSAIDs can cause stomach bleeding or kidney problems in certain people. If you take blood thinner medicine, always ask if NSAIDs are safe for you. Always read the medicine label and follow directions. Do not give these  medicines to children younger than 6 months without direction from a healthcare provider.     Antihistamines  help decrease itching.    Take your medicine as directed.  Contact your healthcare provider if you think your medicine is not helping or if you have side effects. Tell your provider if you are allergic to any medicine. Keep a list of the medicines, vitamins, and herbs you take. Include the amounts, and when and why you take them. Bring the list or the pill bottles to follow-up visits. Carry your medicine list with you in case of an emergency.    Manage folliculitis:   Clean the area.  Use antibacterial soap to wash the affected area. Change your washcloths and towels every day.    Apply a warm compress.  Wet a clean washcloth with warm water and apply it to the infected skin area to help decrease pain and swelling. Warm compresses may also help drain pus and improve healing.    Do not shave the area.  If possible, do not shave areas that have folliculitis. If you must shave, use an electric razor or new blade every time you shave.    Prevent folliculitis:   Do not share personal items.  Personal items include towels, soap, nail cutters, dishes, and silverware.    Do not wear tight clothing.  Tight-fitting clothes may rub against and irritate your skin.    Treat skin injuries right away.  Wash an injury such as a cut or scrape right away. Use warm, soapy water. Cover the area with a bandage to prevent infection.    Follow up with your doctor or dermatologist as directed:  Write down your questions so you remember to ask them during your visits.  © Copyright [employer] 2023 Information is for End User's use only and may not be sold, redistributed or otherwise used for commercial purposes.  The above information is an  only. It is not intended as medical advice for individual conditions or treatments. Talk to your doctor, nurse or pharmacist before following any medical regimen to see if it is safe  and effective for you.  Skin Yeast Infection   WHAT YOU NEED TO KNOW:   Yeast is normally present on the skin. Infection happens when you have too much yeast, or when it gets into a cut on your skin. Certain types of mold and fungus can cause a yeast infection. A skin yeast infection can appear anywhere on your skin or nail beds. Skin yeast infections are usually found on warm, moist parts of the body. Examples include between skin folds or under the breasts.  DISCHARGE INSTRUCTIONS:   Return to the emergency department if:   You have signs of infection, such as pus, warmth or red streaks coming from the wound, or a fever.      Call your doctor if:   Your symptoms worsen or do not get better within 7 to 10 days.    You have new or returning signs of a skin yeast infection after treatment.    You have questions or concerns about your condition or care.    Medicines:   Antifungal medicine  may be given as a cream, ointment, or pill.    Take your medicine as directed.  Contact your healthcare provider if you think your medicine is not helping or if you have side effects. Tell your provider if you are allergic to any medicine. Keep a list of the medicines, vitamins, and herbs you take. Include the amounts, and when and why you take them. Bring the list or the pill bottles to follow-up visits. Carry your medicine list with you in case of an emergency.    Care for the skin near the infection:  You may only have discolored patches of skin, or areas that are dry and flaking. Care for these skin problems as directed by your healthcare provider. If you have painful skin or an open sore, you will need to protect the skin and prevent damage. You will also need to keep the skin dry as much as possible. Ask your healthcare provider how to care for your skin while the infection clears. The following are general guidelines for caring for painful or open skin:  Keep the skin clean.  Ask your healthcare provider if you should wash  with mild soap and water. Do not use soap that contains alcohol. Alcohol can dry and irritate the skin and make symptoms worse. Your baby's healthcare provider may tell you to use diaper cream or ointment when you change his or her diaper. This will protect the skin and prevent moisture from collecting.    Keep the skin dry.  Pat the area dry with a towel. Do not rub, because this may irritate the skin. If you have a skin yeast infection between skin folds, lift the top part gently and hold it while you dry between your skin folds. Always dry your feet completely after you swim or bathe, including between your toes. Dry your skin if you are sweating from exercise or exposure to heat. Use a clean towel each time to prevent spreading or continuing the infection.    Keep the skin protected.  Ask your healthcare provider if you should cover the area with a bandage or leave it open. Check your skin each day to make sure you do not have new or worsening problems. You may need to have someone check the skin if you cannot see the area easily.    Prevent another skin yeast infection:   Do not share clothing or towels    Wear shower shoes if you need to use a public shower    Dry your feet completely after you bathe, and apply antifungal powder or cream as directed    Put on socks before you get dressed so you do not spread fungus from your feet    Wear light clothing that allows air to get to your skin    Manage your weight to prevent skin folds where yeast can collect    Manage diabetes    Use antibiotics correctly to prevent antibiotic resistance    Change your baby's diaper often, and keep the area clean and dry as much as possible    Use a diaper cream or ointment that contains zinc oxide or dimethicone on your baby's diaper area as directed    Follow up with your doctor as directed:  Write down your questions so you remember to ask them during your visits.  © Copyright [employer] 2023 Information is for End User's use  only and may not be sold, redistributed or otherwise used for commercial purposes.  The above information is an  only. It is not intended as medical advice for individual conditions or treatments. Talk to your doctor, nurse or pharmacist before following any medical regimen to see if it is safe and effective for you.  Doxycycline (By mouth)   Doxycycline ([id number]-mary)  Treats and prevents infections. Also used to prevent malaria and treat rosacea or severe acne. This medicine is a tetracycline antibiotic.   Brand Name(s): Acticlate, Adoxa, Adoxa Daniel 1/150, Avidoxy, Doryx, Doryx MPC, LymePak, Mondoxyne NL, Monodox, Morgidox [id number], Morgidox [id number], Oracea, Targadox, Vibramycin Calcium, Vibramycin Hyclate   There may be other brand names for this medicine.  When This Medicine Should Not Be Used:   This medicine is not right for everyone. Do not use it if you had an allergic reaction to doxycycline or another tetracycline antibiotic, or if you are pregnant or breastfeeding.  How to Use This Medicine:   Capsule, Delayed Release Capsule, Long Acting Capsule, Liquid, Tablet, Delayed Release Tablet  Your doctor will tell you how much medicine to use. Do not use more than directed.  Ask your pharmacist or doctor if you need to take this medicine with or without food. Some forms can be taken with food or milk, but others must be taken on an empty stomach.  Capsule: Swallow whole. Do not break, crush, chew, or open it.  Oracea® capsules: This medicine must be taken on an empty stomach, at least 1 hour before or 2 hours after a meal.  Acticlate® Cap capsules: You may take this medicine with food or milk to avoid stomach irritation.  Delayed-release capsules: You may also take this medicine by sprinkling the open capsules onto cold, soft applesauce. Do not lose any pellets when transferring the contents. Swallow this mixture right away. Do not chew it. Do not store the mixture for later use. You may take  this medicine with food or milk to avoid stomach upset.  Delayed-release tablets: You may also take this medicine by sprinkling the broken tablets onto room-temperature applesauce. Swallow this mixture right away. Do not chew it. Do not store the mixture for later use. You may take this medicine with food or milk to avoid stomach upset.  Oral liquid: Shake the bottle well just before each use. Measure the oral liquid medicine with a marked measuring spoon, oral syringe, or medicine cup.  Tablets: You may take this medicine with food or milk to avoid stomach irritation. To break a tablet, hold the tablet between your thumb and index fingers close to the appropriate scored line. Then, apply enough pressure to snap the tablet segments apart. Do not use the tablet if it does not break on the scored lines.  Take all of the medicine in your prescription to clear up your infection, even if you feel better after the first few doses.  Drink plenty of fluids to avoid throat problems, if you take the capsule or tablet form.  Malaria prevention: Start taking the medicine 1 or 2 days before you travel. Take the medicine every day during your trip. Keep taking it for 4 weeks after you return. However, do not use the medicine for longer than 4 months.  Do not use this medicine for more than 9 months if you are using it for rosacea.  Use only the brand of medicine your doctor prescribed. Other brands may not work the same way.  Read and follow the patient instructions that come with this medicine. Talk to your doctor or pharmacist if you have any questions.  Missed dose: Take a dose as soon as you remember. If it is almost time for your next dose, wait until then and take a regular dose. Do not take extra medicine to make up for a missed dose.  Store the medicine in a closed container at room temperature, away from heat, moisture, and direct light. Do not freeze the oral liquid.  Drugs and Foods to Avoid:   Ask your doctor or  pharmacist before using any other medicine, including over-the-counter medicines, vitamins, and herbal products.  Some medicines can affect how doxycycline works. Tell your doctor if you are using any of the following:  Bismuth subsalicylate, methoxyflurane, penicillin  Acne medicines (including isotretinoin)  Birth control pills  Blood thinner (including warfarin)  Medicine for seizures (including carbamazepine, phenobarbital, phenytoin)  Medicine that contains aluminum, calcium, magnesium, or iron (including an antacid or vitamin supplement)  Stomach medicine  Warnings While Using This Medicine:   This medicine may cause birth defects if either partner is using it during conception or pregnancy. Tell your doctor right away if you or your partner becomes pregnant. Birth control pills may not work as well when used together with this medicine. Use a second form of birth control to keep from getting pregnant.  Tell your doctor if you have kidney disease, liver disease, asthma, or an allergy to sulfites. Tell your doctor if you had surgery on your stomach, or if you have a history of yeast infections.  This medicine may cause the following problems:  Permanent change in tooth color (in children younger than 8 years of age)  Serious skin reactions, including exfoliative dermatitis, erythema multiforme, Pace-Baron syndrome (SJS) , toxic epidermal necrolysis, drug reaction with eosinophilia and systemic symptoms (DRESS)  Increased pressure inside the head  Yeast infection  Immune system problems  This medicine can cause diarrhea. Call your doctor if the diarrhea becomes severe, does not stop, or is bloody. Do not take any medicine to stop diarrhea until you have talked to your doctor. Diarrhea can occur 2 months or more after you stop taking this medicine.  This medicine may make your skin more sensitive to sunlight. Wear sunscreen. Do not use sunlamps or tanning beds.  Tell any doctor or dentist who treats you that  you are using this medicine. This medicine may affect certain medical test results.  Call your doctor if your symptoms do not improve or if they get worse.  Your doctor will do lab tests at regular visits to check on the effects of this medicine. Keep all appointments.  Keep all medicine out of the reach of children. Never share your medicine with anyone.  Possible Side Effects While Using This Medicine:   Call your doctor right away if you notice any of these side effects:  Allergic reaction: Itching or hives, swelling in your face or hands, swelling or tingling in your mouth or throat, chest tightness, trouble breathing  Blistering, peeling, red skin rash  Burning, pain, or irritation in your upper stomach or throat  Diarrhea that may contain blood  Fever, chills, cough, runny or stuffy nose, sore throat, body aches  Joint pain, unusual tiredness or weakness  Severe headache, dizziness, vision changes  Sudden and severe stomach pain, nausea, vomiting, lightheadedness  Swollen, painful, or tender lymph glands in the neck, armpit, or groin, or yellow skin or eyes  If you notice these less serious side effects, talk with your doctor:   Darkening of your skin, scars, teeth, or gums  Sores or white patches on your lips, mouth, or throat  If you notice other side effects that you think are caused by this medicine, tell your doctor.   Call your doctor for medical advice about side effects. You may report side effects to FDA at 9-782-FDA-2132  © Copyright [employer] 2023 Information is for End User's use only and may not be sold, redistributed or otherwise used for commercial purposes.  The above information is an  only. It is not intended as medical advice for individual conditions or treatments. Talk to your doctor, nurse or pharmacist before following any medical regimen to see if it is safe and effective for you.        Follow up with PCP in 3-5 days.  Proceed to  ER if symptoms worsen.    Chief Complaint      Chief Complaint   Patient presents with   • Ingrown hair     C/o lump and sore area on pubic area is sore and itchy, pt. States over the last month she had had several ingrown hairs and lumps that lead to drainage.           History of Present Illness       Patient complains of tender area of the skin upper aspect of pubic region for the past month.  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  Affected area had been remotely treated with laser hair removal.  Patient notes having had several ingrown residual hairs in that region in recent months.      Review of Systems   Review of Systems   Constitutional:  Negative for activity change, chills and fever.   Musculoskeletal:  Negative for arthralgias, back pain, joint swelling, myalgias, neck pain and neck stiffness.   Skin:  Positive for color change. Negative for wound.   Neurological:  Negative for dizziness and headaches.         Current Medications       Current Outpatient Medications:   •  clotrimazole (LOTRIMIN) 1 % cream, Apply topically 2 (two) times a day, Disp: 28 g, Rfl: 0  •  doxycycline (ADOXA) 100 MG tablet, Take 1 tablet (100 mg total) by mouth 2 (two) times a day for 10 days May substitute Hyclate, Disp: 20 tablet, Rfl: 0  •  fluconazole (DIFLUCAN) 150 mg tablet, Take 1 tablet (150 mg total) by mouth once for 1 dose, Disp: 1 tablet, Rfl: 0  •  loratadine (CLARITIN) 10 mg tablet, Take 10 mg by mouth, Disp: , Rfl:   •  etonogestrel-ethinyl estradiol (NUVARING) 0.12-0.015 MG/24HR vaginal ring, USES CONTINUOUSLY (Patient not taking: Reported on 6/2/2024), Disp: 5 each, Rfl: 3  •  Inveltys 1 % SUSP, , Disp: , Rfl:     Current Allergies     Allergies as of 06/02/2024 - Reviewed 06/02/2024   Allergen Reaction Noted   • Azithromycin Hives and Throat Swelling 08/15/2019   • Sulfa antibiotics Anaphylaxis 02/07/2014   •  Cefadroxil  02/07/2014   • Cephalosporins Other (See Comments) 08/06/2020   • Pollen extract  02/07/2014            The following portions of the patient's history were reviewed and updated as appropriate: allergies, current medications, past family history, past medical history, past social history, past surgical history and problem list.     Past Medical History:   Diagnosis Date   • Fibrocystic breast    • History of infectious mononucleosis        Past Surgical History:   Procedure Laterality Date   • APPENDECTOMY     • CERVICAL BIOPSY     • MOUTH SURGERY         Family History   Problem Relation Age of Onset   • Diabetes Father    • Sleep apnea Father    • Hypertension Father    • Hyperlipidemia Father    • No Known Problems Half-Sister          Medications have been verified.        Objective   /70 (BP Location: Right arm, Patient Position: Sitting)   Pulse 66   Temp (!) 96.6 °F (35.9 °C)   Resp 18   SpO2 100%        Physical Exam     Physical Exam  Vitals and nursing note reviewed.   Constitutional:       General: She is not in acute distress.     Appearance: Normal appearance. She is well-developed. She is not ill-appearing.   HENT:      Head: Normocephalic and atraumatic.      Right Ear: Tympanic membrane normal.      Left Ear: Tympanic membrane normal.      Nose: Mucosal edema present.      Mouth/Throat:      Pharynx: No oropharyngeal exudate or posterior oropharyngeal erythema.      Tonsils: No tonsillar abscesses.   Musculoskeletal:      Cervical back: Neck supple.   Skin:     General: Skin is warm and dry.      Findings: Erythema and rash present.             Comments: Patch of mildly erythematous skin with some underlying induration upper aspect suprapubic region midline   Neurological:      Mental Status: She is alert and oriented to person, place, and time.   Psychiatric:         Mood and Affect: Mood normal.         Behavior: Behavior normal.         Thought Content: Thought content  normal.         Judgment: Judgment normal.

## 2025-04-17 DIAGNOSIS — Z00.6 ENCOUNTER FOR EXAMINATION FOR NORMAL COMPARISON OR CONTROL IN CLINICAL RESEARCH PROGRAM: ICD-10-CM

## 2025-05-01 ENCOUNTER — APPOINTMENT (OUTPATIENT)
Dept: LAB | Facility: CLINIC | Age: 35
End: 2025-05-01

## 2025-05-01 DIAGNOSIS — Z00.6 ENCOUNTER FOR EXAMINATION FOR NORMAL COMPARISON OR CONTROL IN CLINICAL RESEARCH PROGRAM: ICD-10-CM

## 2025-05-01 PROCEDURE — 36415 COLL VENOUS BLD VENIPUNCTURE: CPT

## 2025-05-11 LAB
APOB+LDLR+PCSK9 GENE MUT ANL BLD/T: NOT DETECTED
BRCA1+BRCA2 DEL+DUP + FULL MUT ANL BLD/T: NOT DETECTED
MLH1+MSH2+MSH6+PMS2 GN DEL+DUP+FUL M: NOT DETECTED